# Patient Record
Sex: FEMALE | Race: WHITE | NOT HISPANIC OR LATINO | Employment: UNEMPLOYED | URBAN - METROPOLITAN AREA
[De-identification: names, ages, dates, MRNs, and addresses within clinical notes are randomized per-mention and may not be internally consistent; named-entity substitution may affect disease eponyms.]

---

## 2017-01-02 RX ORDER — ERGOCALCIFEROL 1.25 MG/1
50000 CAPSULE ORAL WEEKLY
Qty: 12 CAPSULE | Refills: 0 | Status: SHIPPED | OUTPATIENT
Start: 2017-01-02 | End: 2017-04-02

## 2017-03-22 RX ORDER — ONDANSETRON 4 MG/1
TABLET, ORALLY DISINTEGRATING ORAL
Qty: 10 TABLET | Refills: 1 | Status: SHIPPED | OUTPATIENT
Start: 2017-03-22 | End: 2017-05-25 | Stop reason: SDUPTHER

## 2017-05-25 RX ORDER — ONDANSETRON 4 MG/1
TABLET, ORALLY DISINTEGRATING ORAL
Qty: 10 TABLET | Refills: 0 | Status: SHIPPED | OUTPATIENT
Start: 2017-05-25 | End: 2017-06-20 | Stop reason: SDUPTHER

## 2017-06-20 RX ORDER — VERAPAMIL HYDROCHLORIDE 80 MG/1
TABLET ORAL
Qty: 60 TABLET | Refills: 0 | Status: SHIPPED | OUTPATIENT
Start: 2017-06-20 | End: 2017-12-27 | Stop reason: DRUGHIGH

## 2017-06-20 RX ORDER — ONDANSETRON 4 MG/1
TABLET, ORALLY DISINTEGRATING ORAL
Qty: 10 TABLET | Refills: 0 | Status: SHIPPED | OUTPATIENT
Start: 2017-06-20 | End: 2019-01-15 | Stop reason: SDUPTHER

## 2017-07-20 RX ORDER — VERAPAMIL HYDROCHLORIDE 80 MG/1
TABLET ORAL
Qty: 60 TABLET | Refills: 0 | OUTPATIENT
Start: 2017-07-20

## 2017-12-27 ENCOUNTER — TRANSCRIBE ORDERS (OUTPATIENT)
Dept: FAMILY MEDICINE CLINIC | Facility: CLINIC | Age: 52
End: 2017-12-27

## 2017-12-27 ENCOUNTER — OFFICE VISIT (OUTPATIENT)
Dept: FAMILY MEDICINE CLINIC | Facility: CLINIC | Age: 52
End: 2017-12-27

## 2017-12-27 VITALS
OXYGEN SATURATION: 98 % | BODY MASS INDEX: 31.01 KG/M2 | HEIGHT: 67 IN | DIASTOLIC BLOOD PRESSURE: 78 MMHG | HEART RATE: 85 BPM | TEMPERATURE: 97.6 F | WEIGHT: 197.6 LBS | SYSTOLIC BLOOD PRESSURE: 110 MMHG | RESPIRATION RATE: 16 BRPM

## 2017-12-27 DIAGNOSIS — R00.2 HEART PALPITATIONS: ICD-10-CM

## 2017-12-27 DIAGNOSIS — N95.1 MENOPAUSAL SYNDROME: Primary | ICD-10-CM

## 2017-12-27 DIAGNOSIS — Z00.00 WELL ADULT EXAM: Primary | ICD-10-CM

## 2017-12-27 DIAGNOSIS — E55.9 VITAMIN D DEFICIENCY: ICD-10-CM

## 2017-12-27 DIAGNOSIS — R07.2 PRECORDIAL PAIN: Primary | ICD-10-CM

## 2017-12-27 LAB
25(OH)D3 SERPL-MCNC: 28 NG/ML
ALBUMIN SERPL-MCNC: 4.1 G/DL (ref 3.2–4.8)
ALBUMIN/GLOB SERPL: 2 G/DL (ref 1.5–2.5)
ALP SERPL-CCNC: 70 U/L (ref 25–100)
ALT SERPL W P-5'-P-CCNC: 30 U/L (ref 7–40)
ANION GAP SERPL CALCULATED.3IONS-SCNC: 5 MMOL/L (ref 3–11)
ARTICHOKE IGE QN: 164 MG/DL (ref 0–130)
AST SERPL-CCNC: 28 U/L (ref 0–33)
BASOPHILS # BLD AUTO: 0.02 10*3/MM3 (ref 0–0.2)
BASOPHILS NFR BLD AUTO: 0.5 % (ref 0–1)
BILIRUB BLD-MCNC: NEGATIVE MG/DL
BILIRUB SERPL-MCNC: 0.5 MG/DL (ref 0.3–1.2)
BUN BLD-MCNC: 13 MG/DL (ref 9–23)
BUN/CREAT SERPL: 18.6 (ref 7–25)
CALCIUM SPEC-SCNC: 9 MG/DL (ref 8.7–10.4)
CHLORIDE SERPL-SCNC: 105 MMOL/L (ref 99–109)
CHOLEST SERPL-MCNC: 256 MG/DL (ref 0–200)
CLARITY, POC: CLEAR
CO2 SERPL-SCNC: 30 MMOL/L (ref 20–31)
COLOR UR: YELLOW
CREAT BLD-MCNC: 0.7 MG/DL (ref 0.6–1.3)
DEPRECATED RDW RBC AUTO: 43.9 FL (ref 37–54)
EOSINOPHIL # BLD AUTO: 0.09 10*3/MM3 (ref 0–0.3)
EOSINOPHIL NFR BLD AUTO: 2.3 % (ref 0–3)
ERYTHROCYTE [DISTWIDTH] IN BLOOD BY AUTOMATED COUNT: 12.8 % (ref 11.3–14.5)
GFR SERPL CREATININE-BSD FRML MDRD: 87 ML/MIN/1.73
GLOBULIN UR ELPH-MCNC: 2.1 GM/DL
GLUCOSE BLD-MCNC: 80 MG/DL (ref 70–100)
GLUCOSE UR STRIP-MCNC: NEGATIVE MG/DL
HBA1C MFR BLD: 5.6 % (ref 4.8–5.6)
HCT VFR BLD AUTO: 42.5 % (ref 34.5–44)
HCV AB SER DONR QL: NORMAL
HDLC SERPL-MCNC: 62 MG/DL (ref 40–60)
HGB BLD-MCNC: 13.9 G/DL (ref 11.5–15.5)
IMM GRANULOCYTES # BLD: 0.01 10*3/MM3 (ref 0–0.03)
IMM GRANULOCYTES NFR BLD: 0.3 % (ref 0–0.6)
KETONES UR QL: NEGATIVE
LEUKOCYTE EST, POC: ABNORMAL
LYMPHOCYTES # BLD AUTO: 1.28 10*3/MM3 (ref 0.6–4.8)
LYMPHOCYTES NFR BLD AUTO: 32.8 % (ref 24–44)
MCH RBC QN AUTO: 31 PG (ref 27–31)
MCHC RBC AUTO-ENTMCNC: 32.7 G/DL (ref 32–36)
MCV RBC AUTO: 94.7 FL (ref 80–99)
MONOCYTES # BLD AUTO: 0.36 10*3/MM3 (ref 0–1)
MONOCYTES NFR BLD AUTO: 9.2 % (ref 0–12)
NEUTROPHILS # BLD AUTO: 2.14 10*3/MM3 (ref 1.5–8.3)
NEUTROPHILS NFR BLD AUTO: 54.9 % (ref 41–71)
NITRITE UR-MCNC: NEGATIVE MG/ML
PH UR: 6 [PH] (ref 5–8)
PLATELET # BLD AUTO: 238 10*3/MM3 (ref 150–450)
PMV BLD AUTO: 9.6 FL (ref 6–12)
POTASSIUM BLD-SCNC: 4.1 MMOL/L (ref 3.5–5.5)
PROT SERPL-MCNC: 6.2 G/DL (ref 5.7–8.2)
PROT UR STRIP-MCNC: NEGATIVE MG/DL
RBC # BLD AUTO: 4.49 10*6/MM3 (ref 3.89–5.14)
RBC # UR STRIP: ABNORMAL /UL
SODIUM BLD-SCNC: 140 MMOL/L (ref 132–146)
SP GR UR: 1.02 (ref 1–1.03)
TRIGL SERPL-MCNC: 174 MG/DL (ref 0–150)
TSH SERPL DL<=0.05 MIU/L-ACNC: 2.64 MIU/ML (ref 0.35–5.35)
UROBILINOGEN UR QL: NORMAL
WBC NRBC COR # BLD: 3.9 10*3/MM3 (ref 3.5–10.8)

## 2017-12-27 PROCEDURE — 85025 COMPLETE CBC W/AUTO DIFF WBC: CPT | Performed by: FAMILY MEDICINE

## 2017-12-27 PROCEDURE — 90471 IMMUNIZATION ADMIN: CPT | Performed by: FAMILY MEDICINE

## 2017-12-27 PROCEDURE — 80053 COMPREHEN METABOLIC PANEL: CPT | Performed by: FAMILY MEDICINE

## 2017-12-27 PROCEDURE — 93000 ELECTROCARDIOGRAM COMPLETE: CPT | Performed by: FAMILY MEDICINE

## 2017-12-27 PROCEDURE — 84443 ASSAY THYROID STIM HORMONE: CPT | Performed by: FAMILY MEDICINE

## 2017-12-27 PROCEDURE — 83036 HEMOGLOBIN GLYCOSYLATED A1C: CPT | Performed by: FAMILY MEDICINE

## 2017-12-27 PROCEDURE — 81003 URINALYSIS AUTO W/O SCOPE: CPT | Performed by: FAMILY MEDICINE

## 2017-12-27 PROCEDURE — 36415 COLL VENOUS BLD VENIPUNCTURE: CPT | Performed by: FAMILY MEDICINE

## 2017-12-27 PROCEDURE — 99396 PREV VISIT EST AGE 40-64: CPT | Performed by: FAMILY MEDICINE

## 2017-12-27 PROCEDURE — 90686 IIV4 VACC NO PRSV 0.5 ML IM: CPT | Performed by: FAMILY MEDICINE

## 2017-12-27 PROCEDURE — 82306 VITAMIN D 25 HYDROXY: CPT | Performed by: FAMILY MEDICINE

## 2017-12-27 PROCEDURE — 80061 LIPID PANEL: CPT | Performed by: FAMILY MEDICINE

## 2017-12-27 PROCEDURE — 86803 HEPATITIS C AB TEST: CPT | Performed by: FAMILY MEDICINE

## 2017-12-27 RX ORDER — SUMATRIPTAN 3 MG/1
INJECTION, SOLUTION SUBCUTANEOUS
Refills: 5 | COMMUNITY
Start: 2017-12-15

## 2017-12-27 RX ORDER — FROVATRIPTAN SUCCINATE 2.5 MG/1
205 TABLET, FILM COATED ORAL AS NEEDED
Refills: 4 | COMMUNITY
Start: 2017-12-15

## 2017-12-27 RX ORDER — ZONISAMIDE 25 MG/1
25 CAPSULE ORAL NIGHTLY
Refills: 4 | COMMUNITY
Start: 2017-12-14 | End: 2019-01-15

## 2017-12-27 RX ORDER — VERAPAMIL HYDROCHLORIDE 40 MG/1
40 TABLET ORAL NIGHTLY
Refills: 4 | COMMUNITY
Start: 2017-12-14

## 2017-12-27 RX ORDER — TIZANIDINE HYDROCHLORIDE 4 MG/1
4 CAPSULE, GELATIN COATED ORAL NIGHTLY
Refills: 4 | COMMUNITY
Start: 2017-12-14

## 2017-12-27 NOTE — PROGRESS NOTES
"Subjective   Barb Josue is a 52 y.o. female and is here for a comprehensive physical exam. The patient reports problems - headaches, chest p[ain and palpatations.        Are you taking aspirin daily? no        The following portions of the patient's history were reviewed and updated as appropriate: allergies, current medications, past family history, past medical history, past social history, past surgical history and problem list.    Review of Systems  Do you have pain that bothers you in your daily life? no  A comprehensive review of systems was negative.  Except for chronic migraine headaches, allergies, occasional heart palpitations and feeling of heaviness in the center of her left chest    Objective   /78  Pulse 85  Temp 97.6 °F (36.4 °C)  Resp 16  Ht 170.2 cm (67\")  Wt 89.6 kg (197 lb 9.6 oz)  SpO2 98%  BMI 30.95 kg/m2    General Appearance:    Alert, cooperative, no distress, appears stated age   Head:    Normocephalic, without obvious abnormality, atraumatic   Eyes:    PERRL, conjunctiva/corneas clear, EOM's intact, fundi     benign, both eyes   Ears:    Normal TM's and external ear canals, both ears   Nose:   Nares normal, septum midline, mucosa normal, no drainage    or sinus tenderness   Throat:   Lips, mucosa, and tongue normal; teeth and gums normal   Neck:   Supple, symmetrical, trachea midline, no adenopathy;     thyroid:  no enlargement/tenderness/nodules; no carotid    bruit or JVD   Back:     Symmetric, no curvature, ROM normal, no CVA tenderness   Lungs:     Clear to auscultation bilaterally, respirations unlabored   Chest Wall:    No tenderness or deformity    Heart:    Regular rate and rhythm, S1 and S2 normal, no murmur, rub   or gallop   Breast Exam:   per gyn   Abdomen:     Soft, non-tender, bowel sounds active all four quadrants,     no masses, no organomegaly   Genitalia:    Normal female without lesion, discharge or tenderness   Rectal:    Per gyn   Extremities:   " Extremities normal, atraumatic, no cyanosis or edema   Pulses:   2+ and symmetric all extremities   Skin:   Skin color, texture, turgor normal, no rashes or lesions   Lymph nodes:   Cervical, supraclavicular, and axillary nodes normal   Neurologic:   CNII-XII intact, normal strength, sensation and reflexes     throughout        Assessment/Plan   Healthy female exam     1. Migraines, atypical chest pain, palpitations.  2. Patient Counseling:  --Nutrition: Stressed importance of moderation in sodium/caffeine intake, saturated fat and cholesterol, caloric balance, sufficient intake of fresh fruits, vegetables, fiber, calcium, iron, and 1 mg of folate supplement per day (for females capable of pregnancy).  --Discussed the issue of estrogen replacement, calcium supplement, and the daily use of baby aspirin.  --Exercise: Stressed the importance of regular exercise.   --Substance Abuse: Discussed cessation/primary prevention of tobacco, alcohol, or other drug use; driving or other dangerous activities under the influence; availability of treatment for abuse.    --Sexuality: Discussed sexually transmitted diseases, partner selection, use of condoms, avoidance of unintended pregnancy  and contraceptive alternatives.   --Injury prevention: Discussed safety belts, safety helmets, smoke detector, smoking near bedding or upholstery.   --Dental health: Discussed importance of regular tooth brushing, flossing, and dental visits.  --Immunizations reviewed.  --Discussed benefits of screening colonoscopy.  --After hours service discussed with patient    3. Discussed the patient's BMI with her.  The BMI is above average; no BMI management plan is appropriate  4. Follow up in one year      ECG 12 Lead  Date/Time: 12/27/2017 4:53 PM  Performed by: PRINCESS KAUR  Authorized by: PRINCESS KAUR   Rhythm: sinus rhythm  Rate: normal  BPM: 74  Conduction: conduction normal  ST Segments: ST segments normal  QRS axis: left  Other: no other  findings  Clinical impression: normal ECG  Comments: Palpations          See form, ok stress testing

## 2018-01-17 ENCOUNTER — APPOINTMENT (OUTPATIENT)
Dept: BONE DENSITY | Facility: HOSPITAL | Age: 53
End: 2018-01-17
Attending: FAMILY MEDICINE

## 2018-01-18 ENCOUNTER — APPOINTMENT (OUTPATIENT)
Dept: CARDIOLOGY | Facility: HOSPITAL | Age: 53
End: 2018-01-18
Attending: FAMILY MEDICINE

## 2018-03-06 ENCOUNTER — HOSPITAL ENCOUNTER (OUTPATIENT)
Dept: CARDIOLOGY | Facility: HOSPITAL | Age: 53
Discharge: HOME | End: 2018-03-06
Attending: FAMILY MEDICINE | Admitting: FAMILY MEDICINE

## 2018-03-06 VITALS — BODY MASS INDEX: 30.76 KG/M2 | WEIGHT: 196 LBS | HEIGHT: 67 IN

## 2018-03-06 DIAGNOSIS — R00.2 HEART PALPITATIONS: ICD-10-CM

## 2018-03-06 LAB
BH CV ECHO MEAS - AO MAX PG (FULL): 1.5 MMHG
BH CV ECHO MEAS - AO MAX PG: 3.9 MMHG
BH CV ECHO MEAS - AO ROOT AREA (BSA CORRECTED): 1.6
BH CV ECHO MEAS - AO ROOT AREA: 7.7 CM^2
BH CV ECHO MEAS - AO ROOT DIAM: 3.1 CM
BH CV ECHO MEAS - AO V2 MAX: 98.2 CM/SEC
BH CV ECHO MEAS - AVA(V,A): 2.4 CM^2
BH CV ECHO MEAS - AVA(V,D): 2.4 CM^2
BH CV ECHO MEAS - BSA(HAYCOCK): 2.1 M^2
BH CV ECHO MEAS - BSA: 2 M^2
BH CV ECHO MEAS - BZI_BMI: 30.7 KILOGRAMS/M^2
BH CV ECHO MEAS - BZI_METRIC_HEIGHT: 170.2 CM
BH CV ECHO MEAS - BZI_METRIC_WEIGHT: 88.9 KG
BH CV ECHO MEAS - CONTRAST EF (2CH): 71 ML/M^2
BH CV ECHO MEAS - CONTRAST EF 4CH: 64.4 ML/M^2
BH CV ECHO MEAS - EDV(CUBED): 68.1 ML
BH CV ECHO MEAS - EDV(MOD-SP2): 69 ML
BH CV ECHO MEAS - EDV(MOD-SP4): 73 ML
BH CV ECHO MEAS - EDV(TEICH): 73.5 ML
BH CV ECHO MEAS - EF(CUBED): 63.8 %
BH CV ECHO MEAS - EF(MOD-SP2): 71 %
BH CV ECHO MEAS - EF(MOD-SP4): 64.4 %
BH CV ECHO MEAS - EF(TEICH): 55.8 %
BH CV ECHO MEAS - ESV(CUBED): 24.7 ML
BH CV ECHO MEAS - ESV(MOD-SP2): 20 ML
BH CV ECHO MEAS - ESV(MOD-SP4): 26 ML
BH CV ECHO MEAS - ESV(TEICH): 32.5 ML
BH CV ECHO MEAS - FS: 28.7 %
BH CV ECHO MEAS - IVS/LVPW: 1
BH CV ECHO MEAS - IVSD: 0.84 CM
BH CV ECHO MEAS - LA DIMENSION: 1.9 CM
BH CV ECHO MEAS - LA/AO: 0.59
BH CV ECHO MEAS - LAT PEAK E' VEL: 9.3 CM/SEC
BH CV ECHO MEAS - LV DIASTOLIC VOL/BSA (35-75): 36.4 ML/M^2
BH CV ECHO MEAS - LV MASS(C)D: 104 GRAMS
BH CV ECHO MEAS - LV MASS(C)DI: 51.9 GRAMS/M^2
BH CV ECHO MEAS - LV MAX PG: 2.4 MMHG
BH CV ECHO MEAS - LV SYSTOLIC VOL/BSA (12-30): 13 ML/M^2
BH CV ECHO MEAS - LV V1 MAX: 77.1 CM/SEC
BH CV ECHO MEAS - LVIDD: 4.1 CM
BH CV ECHO MEAS - LVIDS: 2.9 CM
BH CV ECHO MEAS - LVLD AP2: 6.3 CM
BH CV ECHO MEAS - LVLD AP4: 7.1 CM
BH CV ECHO MEAS - LVLS AP2: 5.4 CM
BH CV ECHO MEAS - LVLS AP4: 5.5 CM
BH CV ECHO MEAS - LVOT AREA (M): 3.1 CM^2
BH CV ECHO MEAS - LVOT AREA: 3 CM^2
BH CV ECHO MEAS - LVOT DIAM: 2 CM
BH CV ECHO MEAS - LVPWD: 0.85 CM
BH CV ECHO MEAS - MED PEAK E' VEL: 6.91 CM/SEC
BH CV ECHO MEAS - MV A MAX VEL: 47.2 CM/SEC
BH CV ECHO MEAS - MV DEC TIME: 0.16 SEC
BH CV ECHO MEAS - MV E MAX VEL: 72.8 CM/SEC
BH CV ECHO MEAS - MV E/A: 1.5
BH CV ECHO MEAS - PA ACC SLOPE: 566.4 CM/SEC^2
BH CV ECHO MEAS - PA ACC TIME: 0.09 SEC
BH CV ECHO MEAS - PA PR(ACCEL): 39.8 MMHG
BH CV ECHO MEAS - RVDD: 2 CM
BH CV ECHO MEAS - SI(CUBED): 21.6 ML/M^2
BH CV ECHO MEAS - SI(MOD-SP2): 24.4 ML/M^2
BH CV ECHO MEAS - SI(MOD-SP4): 23.4 ML/M^2
BH CV ECHO MEAS - SI(TEICH): 20.4 ML/M^2
BH CV ECHO MEAS - SV(CUBED): 43.4 ML
BH CV ECHO MEAS - SV(MOD-SP2): 49 ML
BH CV ECHO MEAS - SV(MOD-SP4): 47 ML
BH CV ECHO MEAS - SV(TEICH): 41 ML
BH CV ECHO MEAS - TAPSE (>1.6): 1.4 CM2
BH CV ECHO MEAS - TR MAX VEL: 193.4 CM/SEC
BH CV STRESS BP STAGE 1: NORMAL
BH CV STRESS BP STAGE 2: NORMAL
BH CV STRESS DURATION MIN STAGE 1: 3
BH CV STRESS DURATION MIN STAGE 2: 2
BH CV STRESS DURATION SEC STAGE 1: 0
BH CV STRESS DURATION SEC STAGE 2: 30
BH CV STRESS GRADE STAGE 1: 10
BH CV STRESS GRADE STAGE 2: 12
BH CV STRESS HR STAGE 1: 134
BH CV STRESS HR STAGE 2: 155
BH CV STRESS METS STAGE 1: 5
BH CV STRESS METS STAGE 2: 7.5
BH CV STRESS PROTOCOL 1: NORMAL
BH CV STRESS RECOVERY BP: NORMAL MMHG
BH CV STRESS RECOVERY HR: 73 BPM
BH CV STRESS SPEED STAGE 1: 1.7
BH CV STRESS SPEED STAGE 2: 2.5
BH CV STRESS STAGE 1: 1
BH CV STRESS STAGE 2: 2
BH CV VAS BP LEFT ARM: NORMAL MMHG
BH CV XLRA - RV BASE: 3.5 CM
BH CV XLRA - RV LENGTH: 6.4 CM
BH CV XLRA - RV MID: 3 CM
BH CV XLRA - TDI S': 9.54 CM/SEC
E/E' RATIO: 9.2
LEFT ATRIUM VOLUME INDEX: 23.5 ML/M2
MAXIMAL PREDICTED HEART RATE: 165 BPM
PERCENT MAX PREDICTED HR: 93.94 %
STRESS BASELINE BP: NORMAL MMHG
STRESS BASELINE HR: 80 BPM
STRESS PERCENT HR: 111 %
STRESS POST ESTIMATED WORKLOAD: 7 METS
STRESS POST EXERCISE DUR MIN: 5 MIN
STRESS POST EXERCISE DUR SEC: 30 SEC
STRESS POST PEAK BP: NORMAL MMHG
STRESS POST PEAK HR: 155 BPM
STRESS TARGET HR: 140 BPM

## 2018-03-06 PROCEDURE — 93350 STRESS TTE ONLY: CPT

## 2018-03-06 PROCEDURE — 93325 DOPPLER ECHO COLOR FLOW MAPG: CPT

## 2018-03-06 PROCEDURE — 93352 ADMIN ECG CONTRAST AGENT: CPT | Performed by: INTERNAL MEDICINE

## 2018-03-06 PROCEDURE — 93018 CV STRESS TEST I&R ONLY: CPT | Performed by: INTERNAL MEDICINE

## 2018-03-06 PROCEDURE — 93017 CV STRESS TEST TRACING ONLY: CPT

## 2018-03-06 PROCEDURE — 25010000002 SULFUR HEXAFLUORIDE MICROSPH 60.7-25 MG RECONSTITUTED SUSPENSION: Performed by: FAMILY MEDICINE

## 2018-03-06 PROCEDURE — 93320 DOPPLER ECHO COMPLETE: CPT

## 2018-03-06 PROCEDURE — 93320 DOPPLER ECHO COMPLETE: CPT | Performed by: INTERNAL MEDICINE

## 2018-03-06 PROCEDURE — 93350 STRESS TTE ONLY: CPT | Performed by: INTERNAL MEDICINE

## 2018-03-06 PROCEDURE — 93325 DOPPLER ECHO COLOR FLOW MAPG: CPT | Performed by: INTERNAL MEDICINE

## 2018-03-06 RX ADMIN — SULFUR HEXAFLUORIDE 7 ML: KIT at 08:05

## 2018-03-07 ENCOUNTER — HOSPITAL ENCOUNTER (OUTPATIENT)
Dept: BONE DENSITY | Facility: HOSPITAL | Age: 53
Discharge: HOME | End: 2018-03-07
Attending: FAMILY MEDICINE

## 2018-03-07 DIAGNOSIS — N95.1 MENOPAUSAL SYNDROME: ICD-10-CM

## 2018-03-07 PROCEDURE — 25010000002 SULFUR HEXAFLUORIDE MICROSPH 60.7-25 MG RECONSTITUTED SUSPENSION: Performed by: FAMILY MEDICINE

## 2018-03-07 RX ADMIN — SULFUR HEXAFLUORIDE 7 ML: KIT at 13:55

## 2018-03-08 ENCOUNTER — OFFICE VISIT (OUTPATIENT)
Dept: FAMILY MEDICINE CLINIC | Facility: CLINIC | Age: 53
End: 2018-03-08

## 2018-03-08 VITALS
BODY MASS INDEX: 30.61 KG/M2 | DIASTOLIC BLOOD PRESSURE: 82 MMHG | WEIGHT: 195 LBS | SYSTOLIC BLOOD PRESSURE: 122 MMHG | HEART RATE: 85 BPM | RESPIRATION RATE: 16 BRPM | HEIGHT: 67 IN | OXYGEN SATURATION: 98 %

## 2018-03-08 DIAGNOSIS — R19.7 DIARRHEA, UNSPECIFIED TYPE: Primary | ICD-10-CM

## 2018-03-08 PROCEDURE — 99213 OFFICE O/P EST LOW 20 MIN: CPT | Performed by: FAMILY MEDICINE

## 2018-03-08 RX ORDER — CONJUGATED ESTROGENS/BAZEDOXIFENE .45; 2 MG/1; MG/1
1 TABLET, FILM COATED ORAL DAILY
Refills: 0 | COMMUNITY
Start: 2018-02-11 | End: 2018-06-13

## 2018-03-08 RX ORDER — SULFAMETHOXAZOLE AND TRIMETHOPRIM 800; 160 MG/1; MG/1
1 TABLET ORAL 2 TIMES DAILY
Qty: 6 TABLET | Refills: 0 | Status: SHIPPED | OUTPATIENT
Start: 2018-03-08 | End: 2019-01-15

## 2018-03-08 NOTE — PROGRESS NOTES
"Subjective   Barb Josue is a 53 y.o. female.     Diarrhea    This is a new problem. The current episode started 1 to 4 weeks ago. The problem occurs 2 to 4 times per day. The stool consistency is described as watery and mucous. The patient states that diarrhea awakens her from sleep. Associated symptoms include abdominal pain and bloating. Pertinent negatives include no chills, fever, myalgias or vomiting. Nothing aggravates the symptoms. There are no known risk factors. She has tried anti-motility drug for the symptoms. The treatment provided mild relief.        The following portions of the patient's history were reviewed and updated as appropriate: allergies, current medications, past social history and problem list.    Review of Systems   Constitutional: Negative for chills and fever.   HENT: Negative for congestion, postnasal drip and sore throat.    Gastrointestinal: Positive for abdominal pain, bloating and diarrhea. Negative for anal bleeding, blood in stool and vomiting.   Genitourinary: Negative for dysuria and hematuria.   Musculoskeletal: Negative for back pain and myalgias.   Skin: Negative.        Objective   /82  Pulse 85  Resp 16  Ht 170.2 cm (67\")  Wt 88.5 kg (195 lb)  SpO2 98%  BMI 30.54 kg/m2  Physical Exam   Constitutional: She is oriented to person, place, and time. She appears well-developed and well-nourished. She is cooperative.   HENT:   Head: Normocephalic and atraumatic.   Mouth/Throat: Oropharynx is clear and moist.   Eyes: Conjunctivae are normal. Pupils are equal, round, and reactive to light. No scleral icterus.   Neck: Neck supple. Carotid bruit is not present. No thyromegaly present.   Cardiovascular: Normal rate and regular rhythm.    Pulmonary/Chest: Effort normal and breath sounds normal.   Abdominal: Soft. Bowel sounds are normal. She exhibits no distension and no mass. There is no hepatosplenomegaly. There is no tenderness.   Musculoskeletal: Normal range of " motion.   Neurological: She is alert and oriented to person, place, and time.   No focal deficits no lateralizing signs   Skin: Skin is warm and dry. No rash noted.   Psychiatric: She has a normal mood and affect. Cognition and memory are normal.   Nursing note and vitals reviewed.      Assessment/Plan   Problem List Items Addressed This Visit     None      Visit Diagnoses     Diarrhea, unspecified type    -  Primary          New Medications Ordered This Visit   Medications   • DUAVEE 0.45-20 MG tablet     Sig: Take 1 tablet by mouth Daily.     Refill:  0   • sulfamethoxazole-trimethoprim (BACTRIM DS) 800-160 MG per tablet     Sig: Take 1 tablet by mouth 2 (Two) Times a Day.     Dispense:  6 tablet     Refill:  0   Follow a low residue diet continue Imodium on a when necessary basis if no better in 7 days return to clinic for further evaluation.

## 2018-03-10 ENCOUNTER — TELEPHONE (OUTPATIENT)
Dept: FAMILY MEDICINE CLINIC | Facility: CLINIC | Age: 53
End: 2018-03-10

## 2018-03-10 NOTE — TELEPHONE ENCOUNTER
Per [place] pt needs to stop taking bactrim and take otc imodium 4 times a day till diarrhea stops, also take cetrocil twice a day. Pt should not drink milk or fruit juice.       ----- Message from Isaias Galindo sent at 3/10/2018  9:22 AM EST -----  Regarding: RX SIDE EFFECT  Contact: [phone]  PT WAS SEEN Thursday BY DR KAUR FOR DIARRHEA AND WAS PUT ON AN ANTIBIOTIC. IT HAS MADE IT MUCH WORSE. CALL Pt.    KRIS DOTY RD

## 2018-03-13 ENCOUNTER — TELEPHONE (OUTPATIENT)
Dept: FAMILY MEDICINE CLINIC | Facility: CLINIC | Age: 53
End: 2018-03-13

## 2018-03-13 NOTE — TELEPHONE ENCOUNTER
Informed pt, she verbalized understanding.  ERIKA SEWELL    ----- Message from Juan Miguel Alegre MD sent at 3/13/2018  1:11 PM EDT -----  Regarding: RE: DIARRHEA  Contact: [phone]  Ok to continue imodium id it helps need to obtain a stool specimen.  ----- Message -----  From: David Matias MA  Sent: 3/13/2018  11:44 AM  To: Juan Miguel Alegre MD  Subject: FW: DIARRHEA                                     Pt called again today, says that she can not keep taking Imodium.    ----- Message -----  From: David Matias MA  Sent: 3/12/2018   2:08 PM  To: Juan Miguel Alegre MD  Subject: FW: DIARRHEA                                         ----- Message -----  From: Briana Oconnell  Sent: 3/12/2018   1:58 PM  To: David Matias MA  Subject: DIARRHEA                                         PT CALLED THIS MRONING AND IS WAITING ON A CALL TO SEE WHAT TO DO REGARDING DARRHEA AND THE ANTIBIOTICS IS MAKING IT WORSE    KRIS DOTY [phone]

## 2018-03-15 ENCOUNTER — TELEPHONE (OUTPATIENT)
Dept: FAMILY MEDICINE CLINIC | Facility: CLINIC | Age: 53
End: 2018-03-15

## 2018-03-15 RX ORDER — DICYCLOMINE HYDROCHLORIDE 10 MG/1
10 CAPSULE ORAL
Qty: 60 CAPSULE | Refills: 1 | Status: SHIPPED | OUTPATIENT
Start: 2018-03-15

## 2018-03-15 NOTE — TELEPHONE ENCOUNTER
Called informed pt.  ERIKA Matias  RMA    ----- Message from Juan Miguel Alegre MD sent at 3/15/2018 11:48 AM EDT -----  Regarding: RE: NEED RX FOR DIARRHEA  Contact: [phone]  rx sent  ----- Message -----  From: David Matias MA  Sent: 3/15/2018  10:24 AM  To: Juan Miguel Alegre MD  Subject: FW: NEED RX FOR DIARRHEA                             ----- Message -----  From: Ania Yip  Sent: 3/15/2018   8:55 AM  To: David Matias MA  Subject: NEED RX FOR DIARRHEA                             PATIENT IS HAVING DIARRHEA [id number].  IMMODIUM IS NOT HELPING AT ALL.  NEEDS RX FOR HER DIARRHEA.  ELENITA BOLIVAR

## 2018-03-21 ENCOUNTER — APPOINTMENT (OUTPATIENT)
Dept: BONE DENSITY | Facility: HOSPITAL | Age: 53
End: 2018-03-21
Attending: FAMILY MEDICINE

## 2018-03-22 ENCOUNTER — TELEPHONE (OUTPATIENT)
Dept: FAMILY MEDICINE CLINIC | Facility: CLINIC | Age: 53
End: 2018-03-22

## 2018-03-27 ENCOUNTER — APPOINTMENT (OUTPATIENT)
Dept: BONE DENSITY | Facility: HOSPITAL | Age: 53
End: 2018-03-27
Attending: FAMILY MEDICINE

## 2018-03-28 ENCOUNTER — HOSPITAL ENCOUNTER (OUTPATIENT)
Dept: BONE DENSITY | Facility: HOSPITAL | Age: 53
Discharge: HOME | End: 2018-03-28
Attending: FAMILY MEDICINE | Admitting: FAMILY MEDICINE

## 2018-03-28 PROCEDURE — 77080 DXA BONE DENSITY AXIAL: CPT

## 2018-06-13 ENCOUNTER — OFFICE VISIT (OUTPATIENT)
Dept: FAMILY MEDICINE CLINIC | Facility: CLINIC | Age: 53
End: 2018-06-13

## 2018-06-13 VITALS
DIASTOLIC BLOOD PRESSURE: 82 MMHG | HEIGHT: 67 IN | SYSTOLIC BLOOD PRESSURE: 116 MMHG | OXYGEN SATURATION: 98 % | RESPIRATION RATE: 16 BRPM | WEIGHT: 193 LBS | TEMPERATURE: 97.9 F | BODY MASS INDEX: 30.29 KG/M2 | HEART RATE: 67 BPM

## 2018-06-13 DIAGNOSIS — M79.604 PAIN OF RIGHT LOWER EXTREMITY: Primary | ICD-10-CM

## 2018-06-13 DIAGNOSIS — M79.2 NEURITIS: ICD-10-CM

## 2018-06-13 PROCEDURE — 99213 OFFICE O/P EST LOW 20 MIN: CPT | Performed by: FAMILY MEDICINE

## 2018-06-13 RX ORDER — OMEPRAZOLE 20 MG/1
20 CAPSULE, DELAYED RELEASE ORAL DAILY
Qty: 60 CAPSULE | Refills: 2 | Status: SHIPPED | OUTPATIENT
Start: 2018-06-13

## 2018-06-14 NOTE — PROGRESS NOTES
"Subjective   Barb Josue is a 53 y.o. female.     Leg Pain    The incident occurred more than 1 week ago (just in toes now no weakness). There was no injury mechanism. The pain is present in the right leg, right ankle and right foot. The quality of the pain is described as aching. The pain is moderate. The pain has been improving since onset.   Toe Pain    The incident occurred 5 to 7 days ago. There was no injury mechanism. The pain is present in the right foot, right toes, left foot and left toes. The pain is moderate. Nothing aggravates the symptoms. She has tried rest and acetaminophen for the symptoms. The treatment provided moderate relief.        The following portions of the patient's history were reviewed and updated as appropriate: allergies, current medications, past social history and problem list.    Review of Systems   Constitutional: Negative for activity change, fatigue and fever.   HENT: Negative for congestion and sinus pressure.    Respiratory: Negative for cough and shortness of breath.    Cardiovascular: Negative for chest pain and leg swelling.   Musculoskeletal: Positive for arthralgias and myalgias.   Skin: Negative.    Neurological: Negative for weakness.        Pain initally all the way down right leg        Objective   /82   Pulse 67   Temp 97.9 °F (36.6 °C)   Resp 16   Ht 170.2 cm (67\")   Wt 87.5 kg (193 lb)   SpO2 98%   BMI 30.23 kg/m²   Physical Exam   Constitutional: She is oriented to person, place, and time. She appears well-developed and well-nourished. She is cooperative.   HENT:   Head: Normocephalic.   Right Ear: External ear normal.   Left Ear: External ear normal.   Nose: Nose normal.   Mouth/Throat: Oropharynx is clear and moist.   Eyes: Conjunctivae are normal. Pupils are equal, round, and reactive to light. No scleral icterus.   Neck: Neck supple. Carotid bruit is not present. No thyromegaly present.   Cardiovascular: Normal rate, regular rhythm and normal " heart sounds.    Pulmonary/Chest: Effort normal and breath sounds normal.   Abdominal: There is no hepatosplenomegaly.   Musculoskeletal: Normal range of motion. She exhibits no edema or tenderness.   Neurological: She is alert and oriented to person, place, and time. She displays normal reflexes. No cranial nerve deficit or sensory deficit. She exhibits normal muscle tone. Coordination normal.   No focal deficits no lateralizing signs   Skin: Skin is warm and dry. No rash noted.   Psychiatric: She has a normal mood and affect. Cognition and memory are normal.   Nursing note and vitals reviewed.      Assessment/Plan   Problem List Items Addressed This Visit     None      Visit Diagnoses     Pain of right lower extremity    -  Primary    Neuritis              New Medications Ordered This Visit   Medications   • omeprazole (PRILOSEC) 20 MG capsule     Sig: Take 1 capsule by mouth Daily.     Dispense:  60 capsule     Refill:  2     reasurred seems to be resolving if worsens rtc for full eval take B complex once a day.

## 2019-01-15 ENCOUNTER — OFFICE VISIT (OUTPATIENT)
Dept: FAMILY MEDICINE CLINIC | Facility: CLINIC | Age: 54
End: 2019-01-15

## 2019-01-15 VITALS
WEIGHT: 192.8 LBS | BODY MASS INDEX: 30.26 KG/M2 | TEMPERATURE: 98 F | RESPIRATION RATE: 16 BRPM | HEIGHT: 67 IN | OXYGEN SATURATION: 98 % | DIASTOLIC BLOOD PRESSURE: 70 MMHG | HEART RATE: 84 BPM | SYSTOLIC BLOOD PRESSURE: 110 MMHG

## 2019-01-15 DIAGNOSIS — Z00.00 WELL ADULT EXAM: Primary | ICD-10-CM

## 2019-01-15 DIAGNOSIS — R00.2 HEART PALPITATIONS: ICD-10-CM

## 2019-01-15 DIAGNOSIS — M22.40 CHONDROMALACIA OF PATELLA, UNSPECIFIED LATERALITY: ICD-10-CM

## 2019-01-15 DIAGNOSIS — E55.9 VITAMIN D DEFICIENCY: ICD-10-CM

## 2019-01-15 PROBLEM — N95.1 MENOPAUSAL SYNDROME: Status: ACTIVE | Noted: 2018-01-08

## 2019-01-15 LAB
25(OH)D3 SERPL-MCNC: 30.9 NG/ML
ALBUMIN SERPL-MCNC: 4.19 G/DL (ref 3.2–4.8)
ALBUMIN/GLOB SERPL: 2.1 G/DL (ref 1.5–2.5)
ALP SERPL-CCNC: 74 U/L (ref 25–100)
ALT SERPL W P-5'-P-CCNC: 48 U/L (ref 7–40)
ANION GAP SERPL CALCULATED.3IONS-SCNC: 4 MMOL/L (ref 3–11)
ARTICHOKE IGE QN: 171 MG/DL (ref 0–130)
AST SERPL-CCNC: 38 U/L (ref 0–33)
BASOPHILS # BLD AUTO: 0.02 10*3/MM3 (ref 0–0.2)
BASOPHILS NFR BLD AUTO: 0.5 % (ref 0–1)
BILIRUB BLD-MCNC: NEGATIVE MG/DL
BILIRUB SERPL-MCNC: 0.6 MG/DL (ref 0.3–1.2)
BUN BLD-MCNC: 15 MG/DL (ref 9–23)
BUN/CREAT SERPL: 20.8 (ref 7–25)
CALCIUM SPEC-SCNC: 8.9 MG/DL (ref 8.7–10.4)
CHLORIDE SERPL-SCNC: 106 MMOL/L (ref 99–109)
CHOLEST SERPL-MCNC: 249 MG/DL (ref 0–200)
CLARITY, POC: CLEAR
CO2 SERPL-SCNC: 30 MMOL/L (ref 20–31)
COLOR UR: YELLOW
CREAT BLD-MCNC: 0.72 MG/DL (ref 0.6–1.3)
DEPRECATED RDW RBC AUTO: 44.7 FL (ref 37–54)
EOSINOPHIL # BLD AUTO: 0.33 10*3/MM3 (ref 0–0.3)
EOSINOPHIL NFR BLD AUTO: 8.4 % (ref 0–3)
ERYTHROCYTE [DISTWIDTH] IN BLOOD BY AUTOMATED COUNT: 13.2 % (ref 11.3–14.5)
GFR SERPL CREATININE-BSD FRML MDRD: 84 ML/MIN/1.73
GLOBULIN UR ELPH-MCNC: 2 GM/DL
GLUCOSE BLD-MCNC: 86 MG/DL (ref 70–100)
GLUCOSE UR STRIP-MCNC: NEGATIVE MG/DL
HCT VFR BLD AUTO: 42.8 % (ref 34.5–44)
HDLC SERPL-MCNC: 60 MG/DL (ref 40–60)
HGB BLD-MCNC: 13.9 G/DL (ref 11.5–15.5)
IMM GRANULOCYTES # BLD AUTO: 0.01 10*3/MM3 (ref 0–0.03)
IMM GRANULOCYTES NFR BLD AUTO: 0.3 % (ref 0–0.6)
KETONES UR QL: NEGATIVE
LEUKOCYTE EST, POC: ABNORMAL
LYMPHOCYTES # BLD AUTO: 1.49 10*3/MM3 (ref 0.6–4.8)
LYMPHOCYTES NFR BLD AUTO: 38.1 % (ref 24–44)
MCH RBC QN AUTO: 30.2 PG (ref 27–31)
MCHC RBC AUTO-ENTMCNC: 32.5 G/DL (ref 32–36)
MCV RBC AUTO: 92.8 FL (ref 80–99)
MONOCYTES # BLD AUTO: 0.42 10*3/MM3 (ref 0–1)
MONOCYTES NFR BLD AUTO: 10.7 % (ref 0–12)
NEUTROPHILS # BLD AUTO: 1.65 10*3/MM3 (ref 1.5–8.3)
NEUTROPHILS NFR BLD AUTO: 42.3 % (ref 41–71)
NITRITE UR-MCNC: NEGATIVE MG/ML
PH UR: 5.5 [PH] (ref 5–8)
PLATELET # BLD AUTO: 247 10*3/MM3 (ref 150–450)
PMV BLD AUTO: 9.8 FL (ref 6–12)
POTASSIUM BLD-SCNC: 4.5 MMOL/L (ref 3.5–5.5)
PROT SERPL-MCNC: 6.2 G/DL (ref 5.7–8.2)
PROT UR STRIP-MCNC: NEGATIVE MG/DL
RBC # BLD AUTO: 4.61 10*6/MM3 (ref 3.89–5.14)
RBC # UR STRIP: ABNORMAL /UL
SODIUM BLD-SCNC: 140 MMOL/L (ref 132–146)
SP GR UR: 1.02 (ref 1–1.03)
TRIGL SERPL-MCNC: 171 MG/DL (ref 0–150)
TSH SERPL DL<=0.05 MIU/L-ACNC: 2.63 MIU/ML (ref 0.35–5.35)
UROBILINOGEN UR QL: NORMAL
WBC NRBC COR # BLD: 3.91 10*3/MM3 (ref 3.5–10.8)

## 2019-01-15 PROCEDURE — 84443 ASSAY THYROID STIM HORMONE: CPT | Performed by: FAMILY MEDICINE

## 2019-01-15 PROCEDURE — 36415 COLL VENOUS BLD VENIPUNCTURE: CPT | Performed by: FAMILY MEDICINE

## 2019-01-15 PROCEDURE — 99396 PREV VISIT EST AGE 40-64: CPT | Performed by: FAMILY MEDICINE

## 2019-01-15 PROCEDURE — 82306 VITAMIN D 25 HYDROXY: CPT | Performed by: FAMILY MEDICINE

## 2019-01-15 PROCEDURE — 81003 URINALYSIS AUTO W/O SCOPE: CPT | Performed by: FAMILY MEDICINE

## 2019-01-15 PROCEDURE — 90471 IMMUNIZATION ADMIN: CPT | Performed by: FAMILY MEDICINE

## 2019-01-15 PROCEDURE — 90686 IIV4 VACC NO PRSV 0.5 ML IM: CPT | Performed by: FAMILY MEDICINE

## 2019-01-15 PROCEDURE — 93000 ELECTROCARDIOGRAM COMPLETE: CPT | Performed by: FAMILY MEDICINE

## 2019-01-15 PROCEDURE — 85025 COMPLETE CBC W/AUTO DIFF WBC: CPT | Performed by: FAMILY MEDICINE

## 2019-01-15 PROCEDURE — 80053 COMPREHEN METABOLIC PANEL: CPT | Performed by: FAMILY MEDICINE

## 2019-01-15 PROCEDURE — 80061 LIPID PANEL: CPT | Performed by: FAMILY MEDICINE

## 2019-01-15 RX ORDER — ONDANSETRON 4 MG/1
4 TABLET, ORALLY DISINTEGRATING ORAL EVERY 8 HOURS PRN
Qty: 20 TABLET | Refills: 1 | Status: SHIPPED | OUTPATIENT
Start: 2019-01-15

## 2019-01-15 RX ORDER — ZONISAMIDE 100 MG/1
200 CAPSULE ORAL 2 TIMES DAILY
Qty: 120 CAPSULE | Refills: 0 | Status: SHIPPED | OUTPATIENT
Start: 2019-01-15

## 2019-01-15 RX ORDER — CLOBETASOL PROPIONATE 0.5 MG/G
OINTMENT TOPICAL
COMMUNITY

## 2019-01-15 RX ORDER — ZONISAMIDE 100 MG/1
CAPSULE ORAL
Refills: 3 | COMMUNITY
Start: 2018-11-27 | End: 2019-01-15 | Stop reason: SDUPTHER

## 2019-01-15 NOTE — PROGRESS NOTES
"Subjective   Brab Josue is a 53 y.o. female and is here for a comprehensive physical exam. The patient reports problems - Chronic migraine headaches, soreness in the knees and elbows, episodic abdominal pain thought to be due to diverticulosis.      The following portions of the patient's history were reviewed and updated as appropriate: allergies, current medications, past family history, past medical history, past social history, past surgical history and problem list.    No Known Allergies    Past Medical History:   Diagnosis Date   • Acute bronchitis    • GERD (gastroesophageal reflux disease)    • H/O mammogram    • Headache      Past Surgical History:   Procedure Laterality Date   • BACK SURGERY       Family History   Problem Relation Age of Onset   • Diabetes Mother    • Hypertension Mother    • Hypertension Father    • Prostate cancer Father    • Other Other         CARDIAC DISORDER     Social History     Socioeconomic History   • Marital status:      Spouse name: Not on file   • Number of children: Not on file   • Years of education: Not on file   • Highest education level: Not on file   Social Needs   • Financial resource strain: Not on file   • Food insecurity - worry: Not on file   • Food insecurity - inability: Not on file   • Transportation needs - medical: Not on file   • Transportation needs - non-medical: Not on file   Occupational History   • Not on file   Tobacco Use   • Smoking status: Never Smoker   • Smokeless tobacco: Never Used   Substance and Sexual Activity   • Alcohol use: Yes   • Drug use: Not on file   • Sexual activity: Not on file   Other Topics Concern   • Not on file   Social History Narrative   • Not on file         Objective     Review of Systems    /70   Pulse 84   Temp 98 °F (36.7 °C)   Resp 16   Ht 170.2 cm (67\")   Wt 87.5 kg (192 lb 12.8 oz)   SpO2 98%   BMI 30.20 kg/m²     Review of Systems   Constitutional: Negative for activity change and " unexpected weight change.   HENT: Negative for congestion and sore throat.    Eyes: Negative for visual disturbance.        Eye exam is due   Respiratory: Negative for cough and shortness of breath.    Cardiovascular: Positive for palpitations. Negative for chest pain and leg swelling.        Feels occasional flutters in her chest   Gastrointestinal: Negative for diarrhea, nausea and vomiting.        Episodic lower abdominal pain attributed to diverticulosis   Genitourinary: Negative for dysuria and hematuria.   Musculoskeletal: Positive for arthralgias. Negative for joint swelling.        Soreness and stiffness in the knees and elbows   Skin: Negative for color change and rash.        Checked by dermatology in 2018   Allergic/Immunologic: Negative for environmental allergies and food allergies.   Neurological: Positive for headaches. Negative for syncope.   Hematological: Negative for adenopathy. Does not bruise/bleed easily.   Psychiatric/Behavioral: Positive for sleep disturbance. Negative for dysphoric mood. The patient is not nervous/anxious.         Wakes up every 2-3 hours             Physical Exam     Physical Exam   Constitutional: She is oriented to person, place, and time. She appears well-developed and well-nourished. She is cooperative.   HENT:   Head: Normocephalic.   Right Ear: External ear normal.   Left Ear: External ear normal.   Nose: Nose normal.   Mouth/Throat: Oropharynx is clear and moist.   Eyes: Conjunctivae are normal. Pupils are equal, round, and reactive to light. No scleral icterus.   Neck: Neck supple. No JVD present. Carotid bruit is not present. No thyromegaly present.   Cardiovascular: Normal rate, regular rhythm, normal heart sounds and intact distal pulses.   Pulmonary/Chest: Effort normal and breath sounds normal.   Abdominal: Soft. Bowel sounds are normal. She exhibits no mass. There is no hepatosplenomegaly.   Musculoskeletal: Normal range of motion. She exhibits tenderness. She  exhibits no edema.   Mildly tender over the left lateral epicondyle   Lymphadenopathy:     She has no cervical adenopathy.   Neurological: She is alert and oriented to person, place, and time. She displays normal reflexes. No cranial nerve deficit. Coordination normal.   No focal deficits no lateralizing signs  Patellar crepitations at the knees   Skin: Skin is warm and dry. No rash noted.   Psychiatric: She has a normal mood and affect. Her behavior is normal. Judgment and thought content normal. Cognition and memory are normal.   Nursing note and vitals reviewed.        Assessment/Plan   Healthy female exam.     1.   Patient Active Problem List   Diagnosis   • Common migraine with intractable migraine   • Gastroesophageal reflux disease   • Chondromalacia of patella   • Vitamin D deficiency   • Gastritis   • Lateral epicondylitis of right elbow   • Menopausal syndrome   • Refractory migraine without aura     2. Patient Counseling:  --Nutrition: Stressed importance of moderation in sodium/caffeine intake, saturated fat and cholesterol, caloric balance, sufficient intake of fresh fruits, vegetables, fiber, calcium, iron, and 1 mg of folate supplement per day (for females capable of pregnancy).  --Discussed the issue of estrogen replacement, calcium supplement, and the daily use of baby aspirin.  --Exercise: Stressed the importance of regular exercise.   --Substance Abuse: Discussed cessation/primary prevention of tobacco, alcohol, or other drug use; driving or other dangerous activities under the influence; availability of treatment for abuse.    --Sexuality: Discussed sexually transmitted diseases, partner selection, use of condoms, avoidance of unintended pregnancy  and contraceptive alternatives.   --Injury prevention: Discussed safety belts, safety helmets, smoke detector, smoking near bedding or upholstery.   --Dental health: Discussed importance of regular tooth brushing, flossing, and dental  visits.  --Immunizations reviewed.  --Discussed benefits of screening colonoscopy.  --After hours service discussed with patient    3. Discussed the patient's BMI with her.  The BMI is above average; BMI management plan is completed  4. Follow up in 6 months    See form    ECG 12 Lead  Date/Time: 1/15/2019 5:36 PM  Performed by: Juan Miguel Alegre MD  Authorized by: Juan Miguel Alegre MD   Rhythm: sinus rhythm  Rate: normal  BPM: 71  Conduction: conduction normal  ST Segments: ST segments normal  T Waves: T waves normal  QRS axis: normal  Other: no other findings  Clinical impression: normal ECG                 Juan Miguel Alegre MD  01/15/2019  5:30 PM

## 2019-02-18 DIAGNOSIS — I47.1 PAROXYSMAL SVT (SUPRAVENTRICULAR TACHYCARDIA) (HCC): Primary | ICD-10-CM
